# Patient Record
(demographics unavailable — no encounter records)

---

## 2024-11-25 NOTE — CONSULT LETTER
[Dear  ___] : Dear  [unfilled], [Courtesy Letter:] : I had the pleasure of seeing your patient, [unfilled], in my office today. [Please see my note below.] : Please see my note below. [Consult Closing:] : Thank you very much for allowing me to participate in the care of this patient.  If you have any questions, please do not hesitate to contact me. [Sincerely,] : Sincerely, [FreeTextEntry2] : Dr Montes [FreeTextEntry3] : Rupesh Frances MD Pediatric Hematology/Oncology [place] [phone]

## 2024-11-25 NOTE — CONSULT LETTER
[Dear  ___] : Dear  [unfilled], [Courtesy Letter:] : I had the pleasure of seeing your patient, [unfilled], in my office today. [Please see my note below.] : Please see my note below. [Consult Closing:] : Thank you very much for allowing me to participate in the care of this patient.  If you have any questions, please do not hesitate to contact me. [Sincerely,] : Sincerely, [FreeTextEntry2] : Dr Montes [FreeTextEntry3] : Rupesh Frances MD Pediatric Hematology/Oncology [place] [phone]

## 2024-11-25 NOTE — RESULTS/DATA
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 Patient reports experiencing nausea, emesis, with URI symptoms starting ~1 week ago for several days but have now resolved. Symptoms can be due to viral etiology, RVP/COVID performed in clinic, will follow up results. Labs performed in clinic today, CBC, CMP, ferritin, hemoglobin electrophoresis, Iron with TIBC, LDH, and Reticulocyte count. Follow up in 3 months as needed. To receive pneumococcal vaccine in clinic today.

## 2024-11-25 NOTE — END OF VISIT
[] : Resident [Time Spent: ___ minutes] : I have spent [unfilled] minutes of time on the encounter which excludes teaching and separately reported services. [FreeTextEntry3] : 16 yo with recent vomiting illness, now resolved and feels well today.  No acute concerns today. Going on a trip to [place] for a few weeks- provided note regarding diganosis and intermittent need to take oxycodone for pain. vaccine record reviewed. PCV20 vaccine today with counseling.  f/u 3 months or sooner with any concerns.

## 2024-11-25 NOTE — HISTORY OF PRESENT ILLNESS
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   Reports feeling sick since last thursday. lasted few days, with constant dizziness, and emesis. First day, every time he ate or drank was having emesis. Second day, emesis frequency decreasing. Took Dramamine. Reports body aches, cough, headaches and congestion. Denies chills or fevers (not measuring at home). Reports not having an appetite, but reports eating at time.  Reports no extremity or joint pain. Denies shortness of breath or chest pain. Endorses fatigue. Denies rashes, bleeding, diarrhea or constipation. No blurry vision noted. Mother reports he stopped going to the gym and exercising for the past 2 weeks.  Going to [facility] next week.  Meds: folic acid, Vit D  Pain medications: Takes ibuprofen for pain primarily, oxycodone available.

## 2024-11-25 NOTE — END OF VISIT
[] : Resident [Time Spent: ___ minutes] : I have spent [unfilled] minutes of time on the encounter which excludes teaching and separately reported services. [FreeTextEntry3] : 18 yo with recent vomiting illness, now resolved and feels well today.  No acute concerns today. Going on a trip to [place] for a few weeks- provided note regarding diganosis and intermittent need to take oxycodone for pain. vaccine record reviewed. PCV20 vaccine today with counseling.  f/u 3 months or sooner with any concerns.